# Patient Record
Sex: MALE | Race: WHITE | Employment: OTHER | URBAN - METROPOLITAN AREA
[De-identification: names, ages, dates, MRNs, and addresses within clinical notes are randomized per-mention and may not be internally consistent; named-entity substitution may affect disease eponyms.]

---

## 2023-01-11 ENCOUNTER — APPOINTMENT (OUTPATIENT)
Dept: CT IMAGING | Facility: CLINIC | Age: 88
End: 2023-01-11
Payer: MEDICARE

## 2023-01-11 ENCOUNTER — APPOINTMENT (OUTPATIENT)
Dept: GENERAL RADIOLOGY | Facility: CLINIC | Age: 88
End: 2023-01-11
Payer: MEDICARE

## 2023-01-11 ENCOUNTER — HOSPITAL ENCOUNTER (EMERGENCY)
Facility: CLINIC | Age: 88
Discharge: ANOTHER HOSPITAL | End: 2023-01-12
Attending: STUDENT IN AN ORGANIZED HEALTH CARE EDUCATION/TRAINING PROGRAM
Payer: MEDICARE

## 2023-01-11 DIAGNOSIS — R41.82 ALTERED MENTAL STATUS, UNSPECIFIED ALTERED MENTAL STATUS TYPE: Primary | ICD-10-CM

## 2023-01-11 DIAGNOSIS — R53.1 GENERAL WEAKNESS: ICD-10-CM

## 2023-01-11 LAB
ABSOLUTE EOS #: 0 K/UL (ref 0–0.4)
ABSOLUTE LYMPH #: 0.6 K/UL (ref 1–4.8)
ABSOLUTE MONO #: 0.9 K/UL (ref 0.1–1.2)
ALBUMIN SERPL-MCNC: 3.4 G/DL (ref 3.5–5.2)
ALBUMIN/GLOBULIN RATIO: 0.9 (ref 1–2.5)
ALP BLD-CCNC: 102 U/L (ref 40–129)
ALT SERPL-CCNC: 19 U/L (ref 5–41)
ANION GAP SERPL CALCULATED.3IONS-SCNC: 10 MMOL/L (ref 9–17)
AST SERPL-CCNC: 22 U/L
BACTERIA: ABNORMAL
BASOPHILS # BLD: 0 % (ref 0–2)
BASOPHILS ABSOLUTE: 0 K/UL (ref 0–0.2)
BILIRUB SERPL-MCNC: 0.5 MG/DL (ref 0.3–1.2)
BILIRUBIN URINE: NEGATIVE
BUN BLDV-MCNC: 14 MG/DL (ref 8–23)
C-REACTIVE PROTEIN: 171.9 MG/L (ref 0–5)
CALCIUM SERPL-MCNC: 9.4 MG/DL (ref 8.6–10.4)
CHLORIDE BLD-SCNC: 97 MMOL/L (ref 98–107)
CHP ED QC CHECK: YES
CO2: 28 MMOL/L (ref 20–31)
COLOR: YELLOW
CREAT SERPL-MCNC: 0.6 MG/DL (ref 0.7–1.2)
EOSINOPHILS RELATIVE PERCENT: 0 % (ref 1–4)
EPITHELIAL CELLS UA: ABNORMAL /HPF (ref 0–5)
FLU A ANTIGEN: NEGATIVE
FLU B ANTIGEN: NEGATIVE
GFR SERPL CREATININE-BSD FRML MDRD: >60 ML/MIN/1.73M2
GLUCOSE BLD-MCNC: 118 MG/DL (ref 70–99)
GLUCOSE BLD-MCNC: 130 MG/DL
GLUCOSE BLD-MCNC: 130 MG/DL (ref 75–110)
GLUCOSE URINE: ABNORMAL
HCT VFR BLD CALC: 35.1 % (ref 41–53)
HEMOGLOBIN: 11.5 G/DL (ref 13.5–17.5)
KETONES, URINE: ABNORMAL
LACTIC ACID: 1.2 MMOL/L (ref 0.5–2.2)
LEUKOCYTE ESTERASE, URINE: NEGATIVE
LIPASE: 8 U/L (ref 13–60)
LYMPHOCYTES # BLD: 4 % (ref 24–44)
MCH RBC QN AUTO: 26.8 PG (ref 26–34)
MCHC RBC AUTO-ENTMCNC: 32.8 G/DL (ref 31–37)
MCV RBC AUTO: 81.8 FL (ref 80–100)
MONOCYTES # BLD: 6 % (ref 2–11)
NITRITE, URINE: NEGATIVE
OTHER OBSERVATIONS UA: ABNORMAL
PDW BLD-RTO: 15.7 % (ref 12.5–15.4)
PH UA: 6.5 (ref 5–8)
PLATELET # BLD: 443 K/UL (ref 140–450)
PMV BLD AUTO: 6.3 FL (ref 6–12)
POTASSIUM SERPL-SCNC: 3.9 MMOL/L (ref 3.7–5.3)
PROTEIN UA: NEGATIVE
RBC # BLD: 4.29 M/UL (ref 4.5–5.9)
RBC UA: ABNORMAL /HPF (ref 0–2)
SARS-COV-2, RAPID: NOT DETECTED
SEDIMENTATION RATE, ERYTHROCYTE: 48 MM/HR (ref 0–20)
SEG NEUTROPHILS: 90 % (ref 36–66)
SEGMENTED NEUTROPHILS ABSOLUTE COUNT: 12.8 K/UL (ref 1.8–7.7)
SODIUM BLD-SCNC: 135 MMOL/L (ref 135–144)
SPECIFIC GRAVITY UA: 1.01 (ref 1–1.03)
SPECIMEN DESCRIPTION: NORMAL
TOTAL PROTEIN: 7.2 G/DL (ref 6.4–8.3)
TROPONIN, HIGH SENSITIVITY: 27 NG/L (ref 0–22)
TROPONIN, HIGH SENSITIVITY: 27 NG/L (ref 0–22)
TURBIDITY: CLEAR
URINE HGB: ABNORMAL
UROBILINOGEN, URINE: NORMAL
WBC # BLD: 14.3 K/UL (ref 3.5–11)
WBC UA: ABNORMAL /HPF (ref 0–5)

## 2023-01-11 PROCEDURE — 83690 ASSAY OF LIPASE: CPT

## 2023-01-11 PROCEDURE — 85652 RBC SED RATE AUTOMATED: CPT

## 2023-01-11 PROCEDURE — 6360000002 HC RX W HCPCS: Performed by: STUDENT IN AN ORGANIZED HEALTH CARE EDUCATION/TRAINING PROGRAM

## 2023-01-11 PROCEDURE — 71260 CT THORAX DX C+: CPT | Performed by: STUDENT IN AN ORGANIZED HEALTH CARE EDUCATION/TRAINING PROGRAM

## 2023-01-11 PROCEDURE — 70450 CT HEAD/BRAIN W/O DYE: CPT

## 2023-01-11 PROCEDURE — 85025 COMPLETE CBC W/AUTO DIFF WBC: CPT

## 2023-01-11 PROCEDURE — 86140 C-REACTIVE PROTEIN: CPT

## 2023-01-11 PROCEDURE — 72125 CT NECK SPINE W/O DYE: CPT

## 2023-01-11 PROCEDURE — 82947 ASSAY GLUCOSE BLOOD QUANT: CPT

## 2023-01-11 PROCEDURE — 2580000003 HC RX 258: Performed by: STUDENT IN AN ORGANIZED HEALTH CARE EDUCATION/TRAINING PROGRAM

## 2023-01-11 PROCEDURE — 96361 HYDRATE IV INFUSION ADD-ON: CPT

## 2023-01-11 PROCEDURE — 36415 COLL VENOUS BLD VENIPUNCTURE: CPT

## 2023-01-11 PROCEDURE — 84484 ASSAY OF TROPONIN QUANT: CPT

## 2023-01-11 PROCEDURE — 87804 INFLUENZA ASSAY W/OPTIC: CPT

## 2023-01-11 PROCEDURE — 6360000004 HC RX CONTRAST MEDICATION: Performed by: STUDENT IN AN ORGANIZED HEALTH CARE EDUCATION/TRAINING PROGRAM

## 2023-01-11 PROCEDURE — 96365 THER/PROPH/DIAG IV INF INIT: CPT

## 2023-01-11 PROCEDURE — 93005 ELECTROCARDIOGRAM TRACING: CPT | Performed by: STUDENT IN AN ORGANIZED HEALTH CARE EDUCATION/TRAINING PROGRAM

## 2023-01-11 PROCEDURE — 87635 SARS-COV-2 COVID-19 AMP PRB: CPT

## 2023-01-11 PROCEDURE — 71045 X-RAY EXAM CHEST 1 VIEW: CPT

## 2023-01-11 PROCEDURE — 72131 CT LUMBAR SPINE W/O DYE: CPT

## 2023-01-11 PROCEDURE — 81001 URINALYSIS AUTO W/SCOPE: CPT

## 2023-01-11 PROCEDURE — 74176 CT ABD & PELVIS W/O CONTRAST: CPT

## 2023-01-11 PROCEDURE — 2580000003 HC RX 258: Performed by: EMERGENCY MEDICINE

## 2023-01-11 PROCEDURE — 80053 COMPREHEN METABOLIC PANEL: CPT

## 2023-01-11 PROCEDURE — 87040 BLOOD CULTURE FOR BACTERIA: CPT

## 2023-01-11 PROCEDURE — 96367 TX/PROPH/DG ADDL SEQ IV INF: CPT

## 2023-01-11 PROCEDURE — 96366 THER/PROPH/DIAG IV INF ADDON: CPT

## 2023-01-11 PROCEDURE — 99285 EMERGENCY DEPT VISIT HI MDM: CPT

## 2023-01-11 PROCEDURE — 83605 ASSAY OF LACTIC ACID: CPT

## 2023-01-11 RX ORDER — FERROUS SULFATE 325(65) MG
325 TABLET ORAL
COMMUNITY

## 2023-01-11 RX ORDER — LANSOPRAZOLE 30 MG/1
30 CAPSULE, DELAYED RELEASE ORAL DAILY
COMMUNITY

## 2023-01-11 RX ORDER — UBIDECARENONE 75 MG
50 CAPSULE ORAL DAILY
COMMUNITY

## 2023-01-11 RX ORDER — SODIUM CHLORIDE 0.9 % (FLUSH) 0.9 %
10 SYRINGE (ML) INJECTION PRN
Status: DISCONTINUED | OUTPATIENT
Start: 2023-01-11 | End: 2023-01-12 | Stop reason: HOSPADM

## 2023-01-11 RX ORDER — CEPHALEXIN 500 MG/1
500 CAPSULE ORAL 2 TIMES DAILY
COMMUNITY

## 2023-01-11 RX ORDER — 0.9 % SODIUM CHLORIDE 0.9 %
500 INTRAVENOUS SOLUTION INTRAVENOUS ONCE
Status: COMPLETED | OUTPATIENT
Start: 2023-01-11 | End: 2023-01-11

## 2023-01-11 RX ORDER — SODIUM CHLORIDE 9 MG/ML
INJECTION, SOLUTION INTRAVENOUS CONTINUOUS
Status: DISCONTINUED | OUTPATIENT
Start: 2023-01-11 | End: 2023-01-12 | Stop reason: HOSPADM

## 2023-01-11 RX ORDER — FUROSEMIDE 20 MG/1
20 TABLET ORAL DAILY
COMMUNITY

## 2023-01-11 RX ORDER — ESCITALOPRAM OXALATE 10 MG/1
10 TABLET ORAL DAILY
COMMUNITY

## 2023-01-11 RX ORDER — POTASSIUM CHLORIDE 750 MG/1
10 CAPSULE, EXTENDED RELEASE ORAL DAILY
COMMUNITY

## 2023-01-11 RX ORDER — DONEPEZIL HYDROCHLORIDE 5 MG/1
5 TABLET, FILM COATED ORAL NIGHTLY
COMMUNITY

## 2023-01-11 RX ORDER — LOPERAMIDE HYDROCHLORIDE 2 MG/1
2 CAPSULE ORAL 4 TIMES DAILY PRN
COMMUNITY

## 2023-01-11 RX ORDER — LORAZEPAM 0.5 MG/1
0.5 TABLET ORAL 2 TIMES DAILY
COMMUNITY

## 2023-01-11 RX ORDER — METOPROLOL SUCCINATE 25 MG/1
25 TABLET, EXTENDED RELEASE ORAL DAILY
COMMUNITY

## 2023-01-11 RX ORDER — OXYCODONE HYDROCHLORIDE 5 MG/1
5 TABLET ORAL EVERY 6 HOURS PRN
COMMUNITY

## 2023-01-11 RX ORDER — ATORVASTATIN CALCIUM 10 MG/1
10 TABLET, FILM COATED ORAL NIGHTLY
COMMUNITY

## 2023-01-11 RX ORDER — METHIMAZOLE 10 MG/1
10 TABLET ORAL DAILY
COMMUNITY

## 2023-01-11 RX ORDER — 0.9 % SODIUM CHLORIDE 0.9 %
70 INTRAVENOUS SOLUTION INTRAVENOUS ONCE
Status: COMPLETED | OUTPATIENT
Start: 2023-01-11 | End: 2023-01-11

## 2023-01-11 RX ADMIN — VANCOMYCIN HYDROCHLORIDE 1250 MG: 1 INJECTION, POWDER, LYOPHILIZED, FOR SOLUTION INTRAVENOUS at 20:12

## 2023-01-11 RX ADMIN — PIPERACILLIN AND TAZOBACTAM 4500 MG: 4; .5 INJECTION, POWDER, FOR SOLUTION INTRAVENOUS at 19:05

## 2023-01-11 RX ADMIN — SODIUM CHLORIDE 500 ML: 9 INJECTION, SOLUTION INTRAVENOUS at 17:29

## 2023-01-11 RX ADMIN — IOPAMIDOL 80 ML: 755 INJECTION, SOLUTION INTRAVENOUS at 19:35

## 2023-01-11 RX ADMIN — SODIUM CHLORIDE 70 ML: 9 INJECTION, SOLUTION INTRAVENOUS at 19:35

## 2023-01-11 RX ADMIN — SODIUM CHLORIDE, PRESERVATIVE FREE 10 ML: 5 INJECTION INTRAVENOUS at 19:35

## 2023-01-11 RX ADMIN — SODIUM CHLORIDE: 9 INJECTION, SOLUTION INTRAVENOUS at 22:32

## 2023-01-11 ASSESSMENT — PAIN DESCRIPTION - LOCATION: LOCATION: GENERALIZED

## 2023-01-11 ASSESSMENT — ENCOUNTER SYMPTOMS
RHINORRHEA: 0
ABDOMINAL PAIN: 0
BACK PAIN: 0
WHEEZING: 0
NAUSEA: 0
VOMITING: 0
CHEST TIGHTNESS: 0
COLOR CHANGE: 0
DIARRHEA: 0
CONSTIPATION: 0
SHORTNESS OF BREATH: 0
COUGH: 0
PHOTOPHOBIA: 0

## 2023-01-11 ASSESSMENT — PAIN DESCRIPTION - PAIN TYPE: TYPE: CHRONIC PAIN

## 2023-01-11 ASSESSMENT — PAIN DESCRIPTION - DESCRIPTORS: DESCRIPTORS: ACHING

## 2023-01-11 ASSESSMENT — PAIN DESCRIPTION - FREQUENCY: FREQUENCY: CONTINUOUS

## 2023-01-11 ASSESSMENT — PAIN - FUNCTIONAL ASSESSMENT
PAIN_FUNCTIONAL_ASSESSMENT: 0-10
PAIN_FUNCTIONAL_ASSESSMENT: NONE - DENIES PAIN

## 2023-01-11 ASSESSMENT — PAIN DESCRIPTION - ONSET: ONSET: ON-GOING

## 2023-01-11 NOTE — ED NOTES
Called [facility] spoke to Francis STOKES, Pt report was not received prior to ER arrival of pt. Pt wife requesting  to go to [facility]. [facility] called, beds at [facility] are >24 hr wait times. Will update wife at bedside. PT report relayed by Sandra Sanchez / [facility]. ECF called 911 today , states pt had a fever , increased heart rate and generalized weakness. Pt was not able to get out of bed for dinner due to weakness.       Rojas Burton RN  01/11/23 0167

## 2023-01-11 NOTE — ED PROVIDER NOTES
[facility]  [facility]  Phone: [facility]  EMERGENCY DEPARTMENT ENCOUNTER      Pt Name: Kendra Stringer  MRN: [id number]  Amosgfankit 1935  Date of evaluation: 1/11/2023  Provider: Niurka Domínguez DO    CHIEF COMPLAINT       Chief Complaint   Patient presents with    Fatigue         HISTORY OF PRESENT ILLNESS   (Location/Symptom, Timing/Onset,Context/Setting, Quality, Duration, Modifying Factors, Severity)  Note limiting factors. Kendra Stringer is a 80 y.o. male who presents to the emergency department via EMS with generalized fatigue. In route to the hospital, patient's heart rate was in the low 100s, and patient had a pulse ox of 86% on room air. Patient placed on 2 L nasal cannula on arrival.  Patient currently resides at [facility]. Wife reports that yesterday patient had a period where he was aphasic and did not speak, but then this resolved. Patient's wife reports she was told that he had a \"mini stroke\" and they took an EKG at that time. He has since started talking again but when they went to get him up this morning he was \"stiff as a board\". No known history of seizures. 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 Patient's wife provides entirety of the history as patient has a history of dementia and difficulty answering some questions regarding the last couple days. He denies any recent illnesses. Does endorse a headache as well as generalized fatigue and right-sided neck pain. Nursing Notes were reviewed. REVIEW OF SYSTEMS    (2-9systems for level 4, 10 or more for level 5)     Review of Systems   Constitutional:  Positive for fatigue. Negative for chills, diaphoresis and fever. HENT:  Negative for congestion and rhinorrhea. Eyes:  Negative for photophobia and visual disturbance.    Respiratory:  Negative for cough, chest tightness, shortness of breath and wheezing. Cardiovascular:  Negative for chest pain, palpitations and leg swelling. Gastrointestinal:  Negative for abdominal pain, constipation, diarrhea, nausea and vomiting. Endocrine: Negative for polydipsia, polyphagia and polyuria. Genitourinary:  Negative for decreased urine volume, difficulty urinating, dysuria, flank pain and hematuria. Musculoskeletal:  Positive for neck pain (Right-sided neck pain). Negative for arthralgias, back pain and neck stiffness. Skin:  Negative for color change and rash. Neurological:  Positive for headaches. Negative for dizziness, weakness, light-headedness and numbness. PAST MEDICAL HISTORY     Past Medical History:   Diagnosis Date    Cancer ([facility] [address]     Gastro-esophageal reflux     Hypertension          SURGICAL HISTORY       Past Surgical History:   Procedure Laterality Date    CARDIAC SURGERY           CURRENT MEDICATIONS     Previous Medications    ATORVASTATIN (LIPITOR) 10 MG TABLET    Take 10 mg by mouth at bedtime    CEPHALEXIN (KEFLEX) 500 MG CAPSULE    Take 500 mg by mouth in the morning and at bedtime    DONEPEZIL (ARICEPT) 5 MG TABLET    Take 5 mg by mouth nightly    EMPAGLIFLOZIN (JARDIANCE) 10 MG TABLET    Take 10 mg by mouth daily    ESCITALOPRAM (LEXAPRO) 10 MG TABLET    Take 10 mg by mouth daily    FERROUS SULFATE (IRON 325) 325 (65 FE) MG TABLET    Take 325 mg by mouth daily (with breakfast)    FUROSEMIDE (LASIX) 20 MG TABLET    Take 20 mg by mouth daily    LANSOPRAZOLE (PREVACID) 30 MG DELAYED RELEASE CAPSULE    Take 30 mg by mouth daily    LOPERAMIDE (IMODIUM) 2 MG CAPSULE    Take 2 mg by mouth 4 times daily as needed for Diarrhea    LORAZEPAM (ATIVAN) 0.5 MG TABLET    Take 0.5 mg by mouth 2 times daily.     METHIMAZOLE (TAPAZOLE) 10 MG TABLET    Take 10 mg by mouth daily    METOPROLOL SUCCINATE (TOPROL XL) 25 MG EXTENDED RELEASE TABLET    Take 25 mg by mouth daily    OXYCODONE (ROXICODONE) 5 MG IMMEDIATE RELEASE TABLET    Take 5 mg by mouth every 6 hours as needed for Pain. POTASSIUM CHLORIDE (MICRO-K) 10 MEQ EXTENDED RELEASE CAPSULE    Take 10 mEq by mouth daily    SACUBITRIL-VALSARTAN (ENTRESTO) 24-26 MG PER TABLET    Take 1 tablet by mouth 2 times daily    VITAMIN B-12 (CYANOCOBALAMIN) 100 MCG TABLET    Take 50 mcg by mouth daily       ALLERGIES     Patient has no known allergies. FAMILY HISTORY     History reviewed. No pertinent family history. SOCIAL HISTORY       Social History     Socioeconomic History    Marital status:      Spouse name: None    Number of children: None    Years of education: None    Highest education level: None   Tobacco Use    Smoking status: Never    Smokeless tobacco: Never   Substance and Sexual Activity    Alcohol use: Not Currently    Drug use: Never       SCREENINGS    Vineet Coma Scale  Eye Opening: Spontaneous  Best Verbal Response: Oriented  Best Motor Response: Obeys commands  Vineet Coma Scale Score: 15        PHYSICAL EXAM    (up to 7 for level 4, 8 or more for level 5)     ED Triage Vitals [01/11/23 1704]   BP Temp Temp Source Heart Rate Resp SpO2 Height Weight   135/87 98.9 °F (37.2 °C) Oral 94 20 95 % 5' 9\" (1.753 m) 167 lb 12.8 oz (76.1 kg)       Physical Exam  Constitutional:       General: He is not in acute distress. Appearance: He is well-developed. He is ill-appearing (Chronically). Comments: Pale   HENT:      Head: Normocephalic and atraumatic. Nose: Nose normal.      Mouth/Throat:      Mouth: Mucous membranes are moist.      Pharynx: Oropharynx is clear. Eyes:      Conjunctiva/sclera: Conjunctivae normal.      Pupils: Pupils are equal, round, and reactive to light. Neck:      Vascular: No JVD. Trachea: No tracheal deviation. Cardiovascular:      Rate and Rhythm: Regular rhythm. Tachycardia present. Pulses: Normal pulses. Heart sounds: Normal heart sounds. Pulmonary:      Effort: Pulmonary effort is normal. No respiratory distress. Breath sounds: Normal breath sounds. Abdominal:      General: Abdomen is flat. There is no distension. Palpations: Abdomen is soft. Tenderness: There is no abdominal tenderness. There is no guarding. Musculoskeletal:      Cervical back: Normal range of motion and neck supple. Right lower leg: No edema. Left lower leg: No edema. Skin:     General: Skin is warm and dry. Capillary Refill: Capillary refill takes 2 to 3 seconds. Coloration: Skin is not pale. Findings: No erythema or rash. Neurological:      General: No focal deficit present. Mental Status: He is alert. Sensory: No sensory deficit. Motor: No weakness. Comments: Oriented to self and time, but not situation   Psychiatric:         Mood and Affect: Mood normal.         Behavior: Behavior normal.       EMERGENCY DEPARTMENT COURSE and DIFFERENTIAL DIAGNOSIS/MDM:   Vitals:    Vitals:    01/11/23 1704   BP: 135/87   Pulse: 94   Resp: 20   Temp: 98.9 °F (37.2 °C)   TempSrc: Oral   SpO2: 95%   Weight: 76.1 kg (167 lb 12.8 oz)   Height: 5' 9\" (1.753 m)     (Acute and chronic problems, notes/tests reviewed, historian, interpretation of tests, discussion, treatments/procedures and monitoring)    This is an 70-year-old male presenting with generalized fatigue generalized weakness, tachycardia, hypoxia, and fevers by EMS. Wife provides the entirety of the history. Patient has had multiple joint infections as well as epidural abscess in the lower thoracic lumbar spine. This diagnosis was from August 2022 at [facility] on MRI. I reviewed previous hospitalization notes from this time. He had lumbar surgery for spinal epidural abscess. Has been on Keflex long-term for this. Patient arrives tachycardic at 105, hypoxic at 87% and afebrile but did have a fever for EMS prior to arrival.  He resides in an ECF. I reviewed his paperwork from [facility], the [facility] at which he resides.   Patient's wife reports that he had an episode of difficulty speaking yesterday that has resolved. She also reports at that time he was \"stiff as a board\". Patient has been complaining of myalgias but he states this is due to arthritis. Patient somewhat slow to answer, chronically ill-appearing but in no acute distress. He has no respiratory distress despite hypoxia and was placed on oxygen prior to arrival and is resting comfortably. I am concerned for an infectious picture especially given his history of MSSA bacteremia and epidural abscesses. Given the acute change in mental status, there may been a seizure component for this as well. Will obtain CT head, sepsis work-up, inflammatory markers, chest x-ray, viral testing. Anticipate transfer/admission. EKG without acute ischemic changes. Patient does have a leukocytosis. Noted at 1800. Patient not hypotensive and lactic is normal so will defer fluid bolus at this well. Blood cultures were obtained, will start broad-spectrum antibiotics for sepsis of unknown origin with metabolic encephalopathy. His CRP is markedly elevated at 171.9. Based on outside records, patient's highest CRP was 150.9 postoperatively. This does not help identify the location of the infection but does suggest that there is an underlying infectious process to patient's mental status changes and vitals. Given patient's history of bacteremia and sepsis, there is significant concern for worsening condition. Chest x-ray does show possible mass like lesion at the right costophrenic angle. Will obtain CT pulmonary embolism to further evaluate and evaluate for patient's tachycardia and hypoxia. Family updated. Pending imaging, patient to be transferred to higher level of care facility, what ever is quickest available and capable of caring for patient. Patient was signed out to overnight physician, awaiting CT pulm embolism as well as urinalysis.           EKG:  Sinus tachycardia with PACs, rate 101, left axis, normal intervals, septal Q waves, no acute ST or T wave changes, abnormal EKG, no prior available for comparison    DIAGNOSTIC RESULTS     LABS:  Labs Reviewed   CBC WITH AUTO DIFFERENTIAL - Abnormal; Notable for the following components:       Result Value    WBC 14.3 (*)     RBC 4.29 (*)     Hemoglobin 11.5 (*)     Hematocrit 35.1 (*)     RDW 15.7 (*)     Seg Neutrophils 90 (*)     Lymphocytes 4 (*)     Eosinophils % 0 (*)     Segs Absolute 12.80 (*)     Absolute Lymph # 0.60 (*)     All other components within normal limits   COMPREHENSIVE METABOLIC PANEL - Abnormal; Notable for the following components:    Glucose 118 (*)     Creatinine 0.60 (*)     Chloride 97 (*)     Albumin 3.4 (*)     Albumin/Globulin Ratio 0.9 (*)     All other components within normal limits   LIPASE - Abnormal; Notable for the following components:    Lipase 8 (*)     All other components within normal limits   TROPONIN - Abnormal; Notable for the following components:    Troponin, High Sensitivity 27 (*)     All other components within normal limits   C-REACTIVE PROTEIN - Abnormal; Notable for the following components:    .9 (*)     All other components within normal limits   SEDIMENTATION RATE - Abnormal; Notable for the following components:    Sed Rate 48 (*)     All other components within normal limits   POC GLUCOSE FINGERSTICK - Abnormal; Notable for the following components:    POC Glucose 130 (*)     All other components within normal limits   POCT GLUCOSE - Normal   COVID-19, RAPID   RAPID INFLUENZA A/B ANTIGENS   CULTURE, BLOOD 1   CULTURE, BLOOD 2   LACTIC ACID   URINALYSIS   TROPONIN       All other labs were within normal range or not returned as of this dictation.    RADIOLOGY:  CT HEAD WO CONTRAST   Final Result   No acute intracranial abnormality.         CT CERVICAL SPINE WO CONTRAST   Final Result   No acute cervical spine abnormality      Severe multilevel degenerative changes         XR CHEST  PORTABLE   Final Result   Possible mass lesion at the right costophrenic angle, consider chest CT for   further evaluation         CT CHEST PULMONARY EMBOLISM W CONTRAST    (Results Pending)                PROCEDURES:  There was significant risk of life threatening deterioration of patient's condition requiring my direct management. Critical care time 40 minutes, excluding any documented procedures. FINAL IMPRESSION      1. Acute metabolic encephalopathy          DISPOSITION/PLAN   DISPOSITION        PATIENT REFERRED TO:  No follow-up provider specified.     DISCHARGE MEDICATIONS:  New Prescriptions    No medications on file          (Please note that portions of this note were completed with a voice recognition program.  Efforts were made to edit the dictations but occasionally words are mis-transcribed.)    Aniya Liu DO  Emergency Physician       Everett Nuñez DO  01/11/23 2394

## 2023-01-11 NOTE — ED NOTES
Pt presents to ED via [facility] from [facility] for fatigue/generalized weakness and hypoxia. EMS states that staff at care center was having a hard time getting pt to sit up and state pt has been lethargic. Pts wife states that she told [facility] to send pt to [facility] and states that EMS did not have permission to bring pt to [place] ED. Wife states she is on the [facility] Board and is attempting to call [facility] to inquire about bed status and admission for pt. Pt is A/Ox4 upon arrival but is slightly lethargic. EMS states pts oxygen saturation was 87% on RA when they arrived but saturation increased \"into the 90's\" with 2L via nasal cannula. O2 saturation upon arrival is 95% with 2LPM supplemental O2. Pt has generalized c/o pain which he states is due to arthritis. Pt arrives PWD, PMS intact. Pt resting on stretcher with call light in reach and family at bedside.     David Gray RN  01/11/23 5757

## 2023-01-12 VITALS
HEART RATE: 89 BPM | DIASTOLIC BLOOD PRESSURE: 82 MMHG | BODY MASS INDEX: 24.85 KG/M2 | HEIGHT: 69 IN | WEIGHT: 167.8 LBS | SYSTOLIC BLOOD PRESSURE: 129 MMHG | RESPIRATION RATE: 16 BRPM | OXYGEN SATURATION: 96 % | TEMPERATURE: 98.9 F

## 2023-01-12 LAB
AMMONIA: 24 UMOL/L (ref 16–60)
EKG ATRIAL RATE: 101 BPM
EKG P AXIS: 3 DEGREES
EKG P-R INTERVAL: 168 MS
EKG Q-T INTERVAL: 352 MS
EKG QRS DURATION: 94 MS
EKG QTC CALCULATION (BAZETT): 456 MS
EKG R AXIS: -35 DEGREES
EKG T AXIS: 64 DEGREES
EKG VENTRICULAR RATE: 101 BPM
TSH SERPL DL<=0.05 MIU/L-ACNC: 4.36 UIU/ML (ref 0.3–5)

## 2023-01-12 PROCEDURE — 82140 ASSAY OF AMMONIA: CPT

## 2023-01-12 PROCEDURE — 84443 ASSAY THYROID STIM HORMONE: CPT

## 2023-01-12 ASSESSMENT — PAIN - FUNCTIONAL ASSESSMENT
PAIN_FUNCTIONAL_ASSESSMENT: NONE - DENIES PAIN
PAIN_FUNCTIONAL_ASSESSMENT: NONE - DENIES PAIN

## 2023-01-12 NOTE — ED NOTES
Pt repositioned for comfort and found to have spilled urine from urinal resulting in complete linen change. Pt had a second soft brown stool, personal hygiene performed. Coccyx region found to be reddened, duoderm type dressing applied to affected area. Pt alert and oriented to person place and circumstance.      Lo Bo RN  01/12/23 5069

## 2023-01-12 NOTE — ED NOTES
[facility] notified of need for admission to [facility].  Hospitalist paged     Coby Gold RN  01/12/23 0000

## 2023-01-12 NOTE — ED NOTES
Pt has been resting comfortably, arouses easily and answers questions appropriately the majority of the time. Pt has been repositioned for comfort frequently.  Family at bedside     Mitzy Gupta, [address] [facility]  01/11/23 2091

## 2023-01-12 NOTE — ED PROVIDER NOTES
[facility] ED  [address] [facility]  Phone: [phone]        ADDENDUM:      Care of this patient was assumed from Dr. Concetta Lawler. The patient was seen for Fatigue  . The patient's initial evaluation and plan have been discussed with the prior provider who initially evaluated the patient. Nursing Notes, Past Medical Hx, Past Surgical Hx, Allergies, were all reviewed. PAST MEDICAL HISTORY    has a past medical history of Cancer ([address] Gastro-esophageal reflux, and Hypertension. SURGICAL HISTORY      has a past surgical history that includes Cardiac surgery. CURRENT MEDICATIONS       Previous Medications    ATORVASTATIN (LIPITOR) 10 MG TABLET    Take 10 mg by mouth at bedtime    CEPHALEXIN (KEFLEX) 500 MG CAPSULE    Take 500 mg by mouth in the morning and at bedtime    DONEPEZIL (ARICEPT) 5 MG TABLET    Take 5 mg by mouth nightly    EMPAGLIFLOZIN (JARDIANCE) 10 MG TABLET    Take 10 mg by mouth daily    ESCITALOPRAM (LEXAPRO) 10 MG TABLET    Take 10 mg by mouth daily    FERROUS SULFATE (IRON 325) 325 (65 FE) MG TABLET    Take 325 mg by mouth daily (with breakfast)    FUROSEMIDE (LASIX) 20 MG TABLET    Take 20 mg by mouth daily    LANSOPRAZOLE (PREVACID) 30 MG DELAYED RELEASE CAPSULE    Take 30 mg by mouth daily    LOPERAMIDE (IMODIUM) 2 MG CAPSULE    Take 2 mg by mouth 4 times daily as needed for Diarrhea    LORAZEPAM (ATIVAN) 0.5 MG TABLET    Take 0.5 mg by mouth 2 times daily. METHIMAZOLE (TAPAZOLE) 10 MG TABLET    Take 10 mg by mouth daily    METOPROLOL SUCCINATE (TOPROL XL) 25 MG EXTENDED RELEASE TABLET    Take 25 mg by mouth daily    OXYCODONE (ROXICODONE) 5 MG IMMEDIATE RELEASE TABLET    Take 5 mg by mouth every 6 hours as needed for Pain.     POTASSIUM CHLORIDE (MICRO-K) 10 MEQ EXTENDED RELEASE CAPSULE    Take 10 mEq by mouth daily    SACUBITRIL-VALSARTAN (ENTRESTO) 24-26 MG PER TABLET    Take 1 tablet by mouth 2 times daily    VITAMIN B-12 (CYANOCOBALAMIN) 100 MCG TABLET Take 50 mcg by mouth daily       ALLERGIES     has No Known Allergies. Diagnostic Results     LABS:   Results for orders placed or performed during the hospital encounter of 01/11/23   Blood Culture 1    Specimen: Blood   Result Value Ref Range    Specimen Description . BLOOD     Special Requests RIGHT FOREARM 20CC     Culture NO GROWTH <24 HRS    Culture, Blood 2    Specimen: Blood   Result Value Ref Range    Specimen Description . BLOOD     Special Requests LEFT WRIST 20CC     Culture NO GROWTH <24 HRS    COVID-19, Rapid    Specimen: Nasopharyngeal Swab   Result Value Ref Range    Specimen Description . NASOPHARYNGEAL SWAB     SARS-CoV-2, Rapid Not Detected Not Detected   Rapid Influenza A/B Antigens    Specimen: Nasopharyngeal   Result Value Ref Range    Flu A Antigen NEGATIVE NEGATIVE    Flu B Antigen NEGATIVE NEGATIVE   CBC with Auto Differential   Result Value Ref Range    WBC 14.3 (H) 3.5 - 11.0 k/uL    RBC 4.29 (L) 4.5 - 5.9 m/uL    Hemoglobin 11.5 (L) 13.5 - 17.5 g/dL    Hematocrit 35.1 (L) 41 - 53 %    MCV 81.8 80 - 100 fL    MCH 26.8 26 - 34 pg    MCHC 32.8 31 - 37 g/dL    RDW 15.7 (H) 12.5 - 15.4 %    Platelets 267 724 - 461 k/uL    MPV 6.3 6.0 - 12.0 fL    Seg Neutrophils 90 (H) 36 - 66 %    Lymphocytes 4 (L) 24 - 44 %    Monocytes 6 2 - 11 %    Eosinophils % 0 (L) 1 - 4 %    Basophils 0 0 - 2 %    Segs Absolute 12.80 (H) 1.8 - 7.7 k/uL    Absolute Lymph # 0.60 (L) 1.0 - 4.8 k/uL    Absolute Mono # 0.90 0.1 - 1.2 k/uL    Absolute Eos # 0.00 0.0 - 0.4 k/uL    Basophils Absolute 0.00 0.0 - 0.2 k/uL   CMP   Result Value Ref Range    Glucose 118 (H) 70 - 99 mg/dL    BUN 14 8 - 23 mg/dL    Creatinine 0.60 (L) 0.70 - 1.20 mg/dL    Est, Glom Filt Rate >60 >60 mL/min/1.73m2    Calcium 9.4 8.6 - 10.4 mg/dL    Sodium 135 135 - 144 mmol/L    Potassium 3.9 3.7 - 5.3 mmol/L    Chloride 97 (L) 98 - 107 mmol/L    CO2 28 20 - 31 mmol/L    Anion Gap 10 9 - 17 mmol/L    Alkaline Phosphatase 102 40 - 129 U/L    ALT 19 5 - 41 U/L    AST 22 <40 U/L    Total Bilirubin 0.5 0.3 - 1.2 mg/dL    Total Protein 7.2 6.4 - 8.3 g/dL    Albumin 3.4 (L) 3.5 - 5.2 g/dL    Albumin/Globulin Ratio 0.9 (L) 1.0 - 2.5   Lipase   Result Value Ref Range    Lipase 8 (L) 13 - 60 U/L   Troponin   Result Value Ref Range    Troponin, High Sensitivity 27 (H) 0 - 22 ng/L   Urinalysis   Result Value Ref Range    Color, UA Yellow Yellow    Turbidity UA Clear Clear    Glucose, Ur 3+ (A) NEGATIVE    Bilirubin Urine NEGATIVE NEGATIVE    Ketones, Urine SMALL (A) NEGATIVE    Specific Gravity, UA 1.010 1.005 - 1.030    Urine Hgb TRACE (A) NEGATIVE    pH, UA 6.5 5.0 - 8.0    Protein, UA NEGATIVE NEGATIVE    Urobilinogen, Urine Normal Normal    Nitrite, Urine NEGATIVE NEGATIVE    Leukocyte Esterase, Urine NEGATIVE NEGATIVE   Lactic Acid   Result Value Ref Range    Lactic Acid 1.2 0.5 - 2.2 mmol/L   C-Reactive Protein   Result Value Ref Range    .9 (H) 0.0 - 5.0 mg/L   Sedimentation Rate   Result Value Ref Range    Sed Rate 48 (H) 0 - 20 mm/Hr   Troponin   Result Value Ref Range    Troponin, High Sensitivity 27 (H) 0 - 22 ng/L   Microscopic Urinalysis   Result Value Ref Range    WBC, UA 0 TO 2 0 - 5 /HPF    RBC, UA 0 TO 2 0 - 2 /HPF    Epithelial Cells UA None 0 - 5 /HPF    Bacteria, UA None None    Other Observations UA (A) NOT REQ. Utilizing a urinalysis as the only screening method to exclude a potential uropathogen can be unreliable in many patient populations. Rapid screening tests are less sensitive than culture and if UTI is a clinical possibility, culture should be considered despite a negative urinalysis. POCT Glucose   Result Value Ref Range    Glucose 130 mg/dL    QC OK? Yes    POC Glucose Fingerstick   Result Value Ref Range    POC Glucose 130 (H) 75 - 110 mg/dL       RADIOLOGY:  CT LUMBAR SPINE WO CONTRAST   Final Result   1. No acute abdominal abnormality. 2. Diverticulosis without diverticulitis.    3. Moderate stool in the distal colon/rectum, which may suggest constipation. 4. Prostatomegaly. 5. No acute abnormality of the lumbar spine. Multilevel degenerative changes. 6. Scoliosis with L5 pars defects with anterolisthesis. CT ABDOMEN PELVIS WO CONTRAST Additional Contrast? None   Final Result   1. No acute abdominal abnormality. 2. Diverticulosis without diverticulitis. 3. Moderate stool in the distal colon/rectum, which may suggest constipation. 4. Prostatomegaly. 5. No acute abnormality of the lumbar spine. Multilevel degenerative changes. 6. Scoliosis with L5 pars defects with anterolisthesis. CT CHEST PULMONARY EMBOLISM W CONTRAST   Final Result   No evidence of pulmonary embolism or acute aortic disease. Ectatic ascending aorta measuring 4.6 cm. No evidence of dissection. Mild bilateral lower lobe atelectatic changes. No evidence of consolidative   infiltrates. No evidence of lymphadenopathy. CT HEAD WO CONTRAST   Final Result   No acute intracranial abnormality.          CT CERVICAL SPINE WO CONTRAST   Final Result   No acute cervical spine abnormality      Severe multilevel degenerative changes         XR CHEST PORTABLE   Final Result   Possible mass lesion at the right costophrenic angle, consider chest CT for   further evaluation             RECENT VITALS:  BP: 128/84, Temp: 98.9 °F (37.2 °C), Heart Rate: 92, Resp: 17     ED Course     The patient was given the following medications:  Orders Placed This Encounter   Medications    0.9 % sodium chloride bolus    vancomycin (VANCOCIN) 1,250 mg in dextrose 5 % 250 mL IVPB     Order Specific Question:   Antimicrobial Indications     Answer:   Sepsis of Unknown Etiology    piperacillin-tazobactam (ZOSYN) 4,500 mg in sodium chloride 0.9 % 100 mL IVPB (mini-bag)     Order Specific Question:   Antimicrobial Indications     Answer:   Sepsis of Unknown Etiology    iopamidol (ISOVUE-370) 76 % injection 80 mL    0.9 % sodium chloride bolus sodium chloride flush 0.9 % injection 10 mL    0.9 % sodium chloride infusion       Medical Decision Making           The patient is a 80-year-old male with history of epidural abscess and right shin osteomyelitis who presents for evaluation of fever, generalized weakness and fatigue. He has history of surgery for epidural abscess to his spine in August 2021 at [facility] by Dr. Kaylee Taveras. He presents from [place] and the have been using a Tracy lift to move him because he has been unable to ambulate. The family reports that 3 days ago they felt like his speech was garbled and he seemed stiff. He was evaluated by the [place] physician the following day and the family was told he may have had a TIA. The patient has had improvement in his speech but today he spiked a fever while at [place]. Vital signs are stable upon arrival to the ER. Troponin 27 with repeat pending. CBC shows a leukocytosis of 14.3 but is otherwise unremarkable. CMP, lipase, lactic acid, rapid flu and rapid COVID are negative. CRP and ESR are elevated. He denies any back pain. Chest x-ray shows a possible mass lesion at the right costophrenic angle. CT PE study is pending. CT head and CT cervical spine show no acute process. At time of signout urinalysis, second troponin, and CT PE study are pending. The patient states that he is having difficulty urinating into the urinal and requested a straight cath. Urinalysis is grossly unremarkable. Repeat troponin is unchanged to 27. CT PE study shows no evidence of PE or acute aortic disease. He does have an ectatic ascending aorta measuring 4.6 cm without any evidence of dissection. There are mild bilateral lobe atelectatic changes. No evidence of consolidative infiltrates. No evidence of lymphadenopathy or masses.   I do not have a source for his fever I will expand the work-up to include a CT abdomen pelvis and CT lumbar spine since he has recent history of epidural abscess. The patient has been resting comfortably. I reevaluated his abdomen and lumbar spine. He has generalized pain over his low back without any step-off or deformities. Abdomen soft without any focal tenderness to palpation. The patient has pain with any movement of his joints. CT scan showed no acute abdominal abnormality, diverticulosis without diverticulitis, moderate stool in the distal colon, prostamegaly, and scoliosis with L5 pars defect with anterior listhesis. The CT results do not explain the patient's symptoms and I do not have a cause for his fever. His neck is soft and there is no meningismus. After speaking with the family, they would like him transferred to [facility] for further evaluation and treatment. All of his care has been at [facility].  We will page [facility] access. I spoke to the [facility] clinic nurse practitioner, Pari Franks NP, at 2:07 AM and she agrees to accept the patient for transfer under Dr. Carlos Ríos.  We will arrange for ground transport. Disposition     FINAL IMPRESSION      1. Altered mental status, unspecified altered mental status type    2.  General weakness          DISPOSITION/PLAN   DISPOSITION Decision To Transfer 01/11/2023 11:50:06 PM      CONDITION ON DISPOSITION:   Stable        (Please note that portions of this note were completed with a voice recognition program.  Efforts were made to edit the dictations but occasionally words are mis-transcribed.)    Koffi Thomas DO  Emergency Medicine Physician                  Koffi Thomas DO  01/12/23 [id number]

## 2023-01-12 NOTE — ED NOTES
Straight cath specimen obtained, clear dark yellow, sent to lab, pt tolerated well     José Miguel Melo, DIVYA  01/11/23 [id number]

## 2023-01-15 LAB
CULTURE: NORMAL
CULTURE: NORMAL
Lab: NORMAL
Lab: NORMAL
SPECIMEN DESCRIPTION: NORMAL
SPECIMEN DESCRIPTION: NORMAL